# Patient Record
Sex: FEMALE | Race: ASIAN | NOT HISPANIC OR LATINO | URBAN - METROPOLITAN AREA
[De-identification: names, ages, dates, MRNs, and addresses within clinical notes are randomized per-mention and may not be internally consistent; named-entity substitution may affect disease eponyms.]

---

## 2018-10-30 ENCOUNTER — EMERGENCY (EMERGENCY)
Facility: HOSPITAL | Age: 48
LOS: 1 days | Discharge: HOME | End: 2018-10-30
Attending: EMERGENCY MEDICINE
Payer: COMMERCIAL

## 2018-10-30 VITALS
TEMPERATURE: 98 F | RESPIRATION RATE: 18 BRPM | DIASTOLIC BLOOD PRESSURE: 87 MMHG | HEART RATE: 98 BPM | SYSTOLIC BLOOD PRESSURE: 143 MMHG | OXYGEN SATURATION: 100 %

## 2018-10-30 LAB
ALBUMIN SERPL ELPH-MCNC: 4.9 G/DL — SIGNIFICANT CHANGE UP (ref 3.3–5)
ALP SERPL-CCNC: 50 U/L — SIGNIFICANT CHANGE UP (ref 40–120)
ALT FLD-CCNC: 20 U/L — SIGNIFICANT CHANGE UP (ref 10–45)
ANION GAP SERPL CALC-SCNC: 15 MMOL/L — SIGNIFICANT CHANGE UP (ref 5–17)
APTT BLD: 30.7 SEC — SIGNIFICANT CHANGE UP (ref 27.5–36.3)
AST SERPL-CCNC: 15 U/L — SIGNIFICANT CHANGE UP (ref 10–40)
BASOPHILS # BLD AUTO: 0 K/UL — SIGNIFICANT CHANGE UP (ref 0–0.2)
BASOPHILS NFR BLD AUTO: 0.6 % — SIGNIFICANT CHANGE UP (ref 0–2)
BILIRUB SERPL-MCNC: 0.8 MG/DL — SIGNIFICANT CHANGE UP (ref 0.2–1.2)
BUN SERPL-MCNC: 11 MG/DL — SIGNIFICANT CHANGE UP (ref 7–23)
CALCIUM SERPL-MCNC: 9.5 MG/DL — SIGNIFICANT CHANGE UP (ref 8.4–10.5)
CHLORIDE SERPL-SCNC: 100 MMOL/L — SIGNIFICANT CHANGE UP (ref 96–108)
CO2 SERPL-SCNC: 22 MMOL/L — SIGNIFICANT CHANGE UP (ref 22–31)
CREAT SERPL-MCNC: 0.71 MG/DL — SIGNIFICANT CHANGE UP (ref 0.5–1.3)
EOSINOPHIL # BLD AUTO: 0.1 K/UL — SIGNIFICANT CHANGE UP (ref 0–0.5)
EOSINOPHIL NFR BLD AUTO: 0.8 % — SIGNIFICANT CHANGE UP (ref 0–6)
GLUCOSE SERPL-MCNC: 109 MG/DL — HIGH (ref 70–99)
HCT VFR BLD CALC: 47 % — HIGH (ref 34.5–45)
HGB BLD-MCNC: 16 G/DL — HIGH (ref 11.5–15.5)
INR BLD: 1.1 RATIO — SIGNIFICANT CHANGE UP (ref 0.88–1.16)
LYMPHOCYTES # BLD AUTO: 1.2 K/UL — SIGNIFICANT CHANGE UP (ref 1–3.3)
LYMPHOCYTES # BLD AUTO: 16 % — SIGNIFICANT CHANGE UP (ref 13–44)
MCHC RBC-ENTMCNC: 31.1 PG — SIGNIFICANT CHANGE UP (ref 27–34)
MCHC RBC-ENTMCNC: 33.9 GM/DL — SIGNIFICANT CHANGE UP (ref 32–36)
MCV RBC AUTO: 91.7 FL — SIGNIFICANT CHANGE UP (ref 80–100)
MONOCYTES # BLD AUTO: 0.4 K/UL — SIGNIFICANT CHANGE UP (ref 0–0.9)
MONOCYTES NFR BLD AUTO: 4.8 % — SIGNIFICANT CHANGE UP (ref 2–14)
NEUTROPHILS # BLD AUTO: 6 K/UL — SIGNIFICANT CHANGE UP (ref 1.8–7.4)
NEUTROPHILS NFR BLD AUTO: 77.7 % — HIGH (ref 43–77)
PLATELET # BLD AUTO: 245 K/UL — SIGNIFICANT CHANGE UP (ref 150–400)
POTASSIUM SERPL-MCNC: 3.3 MMOL/L — LOW (ref 3.5–5.3)
POTASSIUM SERPL-SCNC: 3.3 MMOL/L — LOW (ref 3.5–5.3)
PROT SERPL-MCNC: 8.2 G/DL — SIGNIFICANT CHANGE UP (ref 6–8.3)
PROTHROM AB SERPL-ACNC: 12.6 SEC — SIGNIFICANT CHANGE UP (ref 10–12.9)
RBC # BLD: 5.13 M/UL — SIGNIFICANT CHANGE UP (ref 3.8–5.2)
RBC # FLD: 11.6 % — SIGNIFICANT CHANGE UP (ref 10.3–14.5)
SODIUM SERPL-SCNC: 137 MMOL/L — SIGNIFICANT CHANGE UP (ref 135–145)
TROPONIN T, HIGH SENSITIVITY RESULT: <6 NG/L — SIGNIFICANT CHANGE UP (ref 0–51)
TROPONIN T, HIGH SENSITIVITY RESULT: <6 NG/L — SIGNIFICANT CHANGE UP (ref 0–51)
WBC # BLD: 7.8 K/UL — SIGNIFICANT CHANGE UP (ref 3.8–10.5)
WBC # FLD AUTO: 7.8 K/UL — SIGNIFICANT CHANGE UP (ref 3.8–10.5)

## 2018-10-30 PROCEDURE — 93010 ELECTROCARDIOGRAM REPORT: CPT

## 2018-10-30 PROCEDURE — 99218: CPT

## 2018-10-30 PROCEDURE — 71046 X-RAY EXAM CHEST 2 VIEWS: CPT | Mod: 26

## 2018-10-30 RX ORDER — SODIUM CHLORIDE 9 MG/ML
3 INJECTION INTRAMUSCULAR; INTRAVENOUS; SUBCUTANEOUS EVERY 8 HOURS
Qty: 0 | Refills: 0 | Status: DISCONTINUED | OUTPATIENT
Start: 2018-10-30 | End: 2018-11-03

## 2018-10-30 RX ORDER — CLOPIDOGREL BISULFATE 75 MG/1
300 TABLET, FILM COATED ORAL ONCE
Qty: 0 | Refills: 0 | Status: COMPLETED | OUTPATIENT
Start: 2018-10-30 | End: 2018-10-30

## 2018-10-30 RX ORDER — POTASSIUM CHLORIDE 20 MEQ
40 PACKET (EA) ORAL ONCE
Qty: 0 | Refills: 0 | Status: COMPLETED | OUTPATIENT
Start: 2018-10-30 | End: 2018-10-30

## 2018-10-30 RX ADMIN — CLOPIDOGREL BISULFATE 300 MILLIGRAM(S): 75 TABLET, FILM COATED ORAL at 20:24

## 2018-10-30 RX ADMIN — Medication 40 MILLIEQUIVALENT(S): at 20:39

## 2018-10-30 NOTE — ED CDU PROVIDER INITIAL DAY NOTE - MEDICAL DECISION MAKING DETAILS
DDx: ACS. Will continue close telemetry monitoring on CDU, coronary CTA, reassess.  ATTG: Dr. Hernandez

## 2018-10-30 NOTE — ED CDU PROVIDER INITIAL DAY NOTE - OBJECTIVE STATEMENT
49yo F w/ pmhx of mildly elevated cholesterol, but not on medication, sent in by Dr. Sangeetha Ramos for chest pain and EKG changes. Pt reports left sided squeezing chest pain since yesterday, worst with movement, intermittent, not associated with any nausea, vomiting, diaphoresis, fever, chills or any other symptoms. Pt stated that she has been working in the yard a lot lately and also has some shoulder pain. Denies any hx of MI, recent travel, leg swelling, not a smoker. Pt is allergic to aspirin 49yo F w/ pmhx of mildly elevated cholesterol, but not on medication, sent in by Dr. Butcher for chest pain and EKG changes. Pt reports left sided squeezing chest pain since yesterday, worst with movement, intermittent, not associated with any nausea, vomiting, diaphoresis, fever, chills or any other symptoms. Pt stated that she has been working in the yard a lot lately and also has some shoulder pain. Denies any hx of MI, recent travel, leg swelling, not a smoker.  In ED, Patient was given 300mg Plavix, had Laboratory testing w/ Troponin x 2 negative, chest x ray no signs of acute pathology and ekg no signs of ST elevation or acute ischemia. Pt was sent to CDU for telemetry, frequent evaluations/vitals, CT Coronary. 49yo F w/ pmhx of mildly elevated cholesterol, but not on medication, sent in by Dr. Butcher for chest pain and EKG changes. T wave changes in inferior leads and V4-V5. Pt reports left sided squeezing chest pain since yesterday, worst with movement, intermittent, not associated with any nausea, vomiting, diaphoresis, fever, chills or any other symptoms. Pt stated that she has been working in the yard a lot lately and also has some shoulder pain. Denies any hx of MI, recent travel, leg swelling, not a smoker.  In ED, Patient was given 300mg Plavix, had Laboratory testing w/ Troponin x 2 negative, chest x ray no signs of acute pathology and ekg no signs of ST elevation or acute ischemia. Pt was sent to CDU for telemetry, frequent evaluations/vitals, CT Coronary.

## 2018-10-30 NOTE — ED PROVIDER NOTE - PMH
H. Pylori Infection  treated with amoxicillin, clarithromycin, omeprazole- last doses on 11/5  MVP (mitral valve prolapse)

## 2018-10-30 NOTE — ED PROVIDER NOTE - MEDICAL DECISION MAKING DETAILS
9yo F w/ pmhx of mildly elevated cholesterol, but not on medication, sent in by Dr. Sangeetha Ramos for chest pain and EKG changes. r/o NSTEMI vs angina, likely CDU for stress test 9yo F w/ pmhx of mildly elevated cholesterol, but not on medication, sent in by Dr. Sangeetha Ramos for chest pain and EKG changes. r/o NSTEMI vs angina, likely CDU for stress test  ATTG: Dr. Hernandez

## 2018-10-30 NOTE — ED CDU PROVIDER INITIAL DAY NOTE - DETAILS
CHEST PAIN r/o ACS  -TELE  -[facility]  -[facility] EVAL  -CT CORONARY   -CASE D/W ATTENDING KATLIN

## 2018-10-30 NOTE — ED ADULT NURSE NOTE - NS ED NURSE DC INFO COMPLEXITY
Moderate: Comprehensive teaching/Verbalized Understanding/Patient asked questions/Returned Demonstration/Simple: Patient demonstrates quick and easy understanding

## 2018-10-30 NOTE — ED ADULT NURSE NOTE - OBJECTIVE STATEMENT
49 Y/o female presents to ED with chest pain starting yesterday consistently since she woke up, then pain this morning upon wake up, but went away now.  pain was midsternal, that did not radiate. Pt denies NVD, no SOB, no dizziness, no chest tightness, no headache.  Pt has PMH of MVP.  Pt denies no abdominal discomfort or abnormal abdominal symptoms, no dysuria, no hematuria.  Pt AOx3, PERRL, breath sounds equal bilaterally, strong peripheral pulses, cap refill <2 sec. no edema noted. EKG done immediately in triage, Pt on Cardiac monitor, educated to notify provider if feeling any pain, NVD chest tightness, patient laying in bed with bed rails up.

## 2018-10-30 NOTE — ED PROVIDER NOTE - OBJECTIVE STATEMENT
49yo F w/ pmhx of mildly elevated cholesterol, but not on medication, sent in by Dr. Sangeetha Ramos for chest pain and EKG changes. Pt reports left sided squeezing chest pain since yesterday, worst with movement, intermittent, not associated with any nausea, vomiting, diaphoresis, fever, chills or any other symptoms. Pt stated that she has been working in the yard a lot lately and also has some shoulder pain. Denies any hx of MI, recent travel, leg swelling, not a smoker. Pt is allergic to aspirin

## 2018-10-30 NOTE — ED ADULT NURSE NOTE - CHPI ED NUR SYMPTOMS NEG
no diaphoresis/no fever/no nausea/no shortness of breath/no vomiting/no syncope/no dizziness/no congestion/no back pain/no chills

## 2018-10-30 NOTE — ED CLERICAL - NS ED CLERK NOTE PRE-ARRIVAL INFORMATION; ADDITIONAL PRE-ARRIVAL INFORMATION
CC/Reason For referral: Chest Pain, Abnormal EKG Changes  Preferred Consultant(if applicable): Cardiology  Who admits for you (if needed): Ramos  Do you have documents you would like to fax over? Yes  Would you still like to speak to an ED attending? No

## 2018-10-31 VITALS
TEMPERATURE: 100 F | OXYGEN SATURATION: 97 % | DIASTOLIC BLOOD PRESSURE: 66 MMHG | RESPIRATION RATE: 19 BRPM | SYSTOLIC BLOOD PRESSURE: 97 MMHG | HEART RATE: 64 BPM

## 2018-10-31 LAB
CHOLEST SERPL-MCNC: 212 MG/DL — HIGH (ref 10–199)
HBA1C BLD-MCNC: 5.3 % — SIGNIFICANT CHANGE UP (ref 4–5.6)
HDLC SERPL-MCNC: 65 MG/DL — SIGNIFICANT CHANGE UP
LIPID PNL WITH DIRECT LDL SERPL: 140 MG/DL — HIGH
TOTAL CHOLESTEROL/HDL RATIO MEASUREMENT: 3.3 RATIO — SIGNIFICANT CHANGE UP (ref 3.3–7.1)
TRIGL SERPL-MCNC: 36 MG/DL — SIGNIFICANT CHANGE UP (ref 10–149)

## 2018-10-31 PROCEDURE — 75574 CT ANGIO HRT W/3D IMAGE: CPT | Mod: 26

## 2018-10-31 PROCEDURE — 80053 COMPREHEN METABOLIC PANEL: CPT

## 2018-10-31 PROCEDURE — 71046 X-RAY EXAM CHEST 2 VIEWS: CPT

## 2018-10-31 PROCEDURE — 75574 CT ANGIO HRT W/3D IMAGE: CPT

## 2018-10-31 PROCEDURE — 85027 COMPLETE CBC AUTOMATED: CPT

## 2018-10-31 PROCEDURE — 99284 EMERGENCY DEPT VISIT MOD MDM: CPT | Mod: 25

## 2018-10-31 PROCEDURE — 99217: CPT

## 2018-10-31 PROCEDURE — 83036 HEMOGLOBIN GLYCOSYLATED A1C: CPT

## 2018-10-31 PROCEDURE — 85610 PROTHROMBIN TIME: CPT

## 2018-10-31 PROCEDURE — G0378: CPT

## 2018-10-31 PROCEDURE — 80061 LIPID PANEL: CPT

## 2018-10-31 PROCEDURE — 84484 ASSAY OF TROPONIN QUANT: CPT

## 2018-10-31 PROCEDURE — 93005 ELECTROCARDIOGRAM TRACING: CPT | Mod: 76

## 2018-10-31 PROCEDURE — 85730 THROMBOPLASTIN TIME PARTIAL: CPT

## 2018-10-31 RX ORDER — METOPROLOL TARTRATE 50 MG
50 TABLET ORAL ONCE
Qty: 0 | Refills: 0 | Status: COMPLETED | OUTPATIENT
Start: 2018-10-31 | End: 2018-10-31

## 2018-10-31 RX ADMIN — Medication 50 MILLIGRAM(S): at 08:40

## 2018-10-31 RX ADMIN — SODIUM CHLORIDE 3 MILLILITER(S): 9 INJECTION INTRAMUSCULAR; INTRAVENOUS; SUBCUTANEOUS at 08:41

## 2018-10-31 NOTE — ED CDU PROVIDER DISPOSITION NOTE - ATTENDING CONTRIBUTION TO CARE
CDU Attending Note -- Pt seen and examined at bedside.  Case discussed c CDU PA.  Pt comfortable, asymptomatic, and has good follow up.  CP free, EKGs WNL, CT coronary WNL and nonactionable.  Does not require further w/u in ED/CDU.  Will d/c c strict return precautions and cards f/u.  --BMM

## 2018-10-31 NOTE — ED ADULT NURSE REASSESSMENT NOTE - NS ED NURSE REASSESS COMMENT FT1
13.00 Hrs Pt was evaluated by  Dr Andrew Bennett with all the result  Pt is Discharged ML out  SALBADOR Matta Explained the follow up care  & gave the Discharge summary  Pt verbalized the understanding on follow up care  Pt had stable vitals steady gait A&OX4 At the time of discharge
Pt received from FARIDEH Alberto. Pt oriented to CDU & plan of care was discussed. Pt A&O x 3. Pt in CDU CT coronary. Pt has an 18 g in AV Pt on cardiac monitor in NSR, HR in 80's. Pt denies any chest pain, or palpitations as of now. Safety & comfort measures maintained. Call bell in reach. Will continue to monitor.
07.00 Am Received Report from FARIDEH Solano  pt is observed for Chest pain awaiting CT coronaries   07.30 Am  Pt Reassessed pt is A&OX 3 ambulatory on steady gait Ethel CP SOB N/V/D Pt is NSR 77/mt on monitor .  has stable vitals  IV site looks clean & dry No signs of infiltration noted   Comfort care & safety measures continued call bell with in the reach  Pt Kept NPO for  CTC  continue to monitor

## 2018-10-31 NOTE — ED CDU PROVIDER SUBSEQUENT DAY NOTE - PROGRESS NOTE DETAILS
CDU PROGRESS NOTE PA PAUL: Pt resting comfortably, feeling well without complaint. NAD, VSS. No events on telemetry. CDU PROGRESS NOTE SALBADOR [place]: Pt resting comfortably, feeling well without complaint. NAD, VSS. No events on telemetry. Patient pending CTC at this time CDU PROGRESS NOTE SALBADOR SINGER: Pt resting comfortably, feeling well without complaint. NAD, VSS. No events on telemetry. Patient tolerated CTC without any issues. Was given 5mg IV lopressor and one sublingual nitro. Feeling well without any concerns pending results. -Sigrid Singer PA-C CTC within normal limits with calcium score of 0. Patient has remained asymptomatic throughout time in cdu. Patient instructed to continue any home medications and follow-up with her pmd this week for reevaluation and continued treatment. Patient given copy of her results and seen by Dr. Read patient is stable for d/c at this time. -Sigrid Singer PA-C

## 2018-10-31 NOTE — ED CDU PROVIDER DISPOSITION NOTE - PLAN OF CARE
(1) You will need to follow-up with your primary care physician in 2-3 days for your chest pain. A copy of your results were given with you to bring to your appt.  (2) Be sure to review attached discharge paperwork.  (3) Drink plenty of fluids to stay hydrated.  (4) Continue your home medications as directed.  (5) Return to ER for worsening chest pain, trouble breathing, palpitations, or any other concerns.

## 2018-10-31 NOTE — ED CDU PROVIDER DISPOSITION NOTE - CLINICAL COURSE
47yo F w/ pmhx of mildly elevated cholesterol, but not on medication, sent in by Dr. Butcher for chest pain and EKG changes. T wave changes in inferior leads and V4-V5. Pt reports left sided squeezing chest pain since yesterday, worst with movement, intermittent, not associated with any nausea, vomiting, diaphoresis, fever, chills or any other symptoms. Pt stated that she has been working in the yard a lot lately and also has some shoulder pain. Patient came to CDU after two negative troponins and resolution of her chest pain. Patient had no acute events overnight and denies any chest pain at this time. Patient given metoprolol 50mg PO for her heart rate. Patient underweight coronary CT. Results of coronary CT show. 47yo F w/ pmhx of mildly elevated cholesterol, but not on medication, sent in by Dr. Butcher for chest pain and EKG changes. T wave changes in inferior leads and V4-V5. Pt reports left sided squeezing chest pain since yesterday, worst with movement, intermittent, not associated with any nausea, vomiting, diaphoresis, fever, chills or any other symptoms. Pt stated that she has been working in the yard a lot lately and also has some shoulder pain. Patient came to CDU after two negative troponins and resolution of her chest pain. Patient had no acute events overnight and denies any chest pain at this time. Patient given metoprolol 50mg PO for her heart rate. Patient underweight coronary CT which she tolerated well. Results of coronary CT show. 47yo F w/ pmhx of mildly elevated cholesterol, but not on medication, sent in by Dr. Butcher for chest pain and EKG changes. T wave changes in inferior leads and V4-V5. Pt reports left sided squeezing chest pain since yesterday, worst with movement, intermittent, not associated with any nausea, vomiting, diaphoresis, fever, chills or any other symptoms. Pt stated that she has been working in the yard a lot lately and also has some shoulder pain. Patient came to CDU after two negative troponins and resolution of her chest pain. Patient had no acute events overnight and denies any chest pain at this time. Patient given metoprolol 50mg PO for her heart rate. Patient underweight coronary CT which she tolerated well. Results of coronary CT show calcium score of 0 and no evidence of ischemia. Results discussed with patient and advised to follow-up with her PCP this week for reevaluation. Patient discharged stable home.

## 2018-10-31 NOTE — ED ADULT NURSE REASSESSMENT NOTE - NSIMPLEMENTINTERV_GEN_ALL_ED
Implemented All Universal Safety Interventions:  [place] to call system. Call bell, personal items and telephone within reach. Instruct patient to call for assistance. Room bathroom lighting operational. Non-slip footwear when patient is off stretcher. Physically safe environment: no spills, clutter or unnecessary equipment. Stretcher in lowest position, wheels locked, appropriate side rails in place.

## 2018-10-31 NOTE — ED CDU PROVIDER SUBSEQUENT DAY NOTE - HISTORY
CDU PROGRESS NOTE PA PAUL: Pt resting comfortably, feeling well without complaint. NAD, VSS. No events on telemetry.

## 2018-11-01 ENCOUNTER — TRANSCRIPTION ENCOUNTER (OUTPATIENT)
Age: 48
End: 2018-11-01

## 2021-09-27 ENCOUNTER — TRANSCRIPTION ENCOUNTER (OUTPATIENT)
Age: 51
End: 2021-09-27

## 2022-01-18 ENCOUNTER — TRANSCRIPTION ENCOUNTER (OUTPATIENT)
Age: 52
End: 2022-01-18